# Patient Record
Sex: MALE | Race: WHITE | NOT HISPANIC OR LATINO | Employment: FULL TIME
[De-identification: names, ages, dates, MRNs, and addresses within clinical notes are randomized per-mention and may not be internally consistent; named-entity substitution may affect disease eponyms.]

---

## 2017-10-11 ENCOUNTER — RECORDS - HEALTHEAST (OUTPATIENT)
Dept: ADMINISTRATIVE | Facility: OTHER | Age: 32
End: 2017-10-11

## 2017-11-22 ENCOUNTER — OFFICE VISIT - HEALTHEAST (OUTPATIENT)
Dept: FAMILY MEDICINE | Facility: CLINIC | Age: 32
End: 2017-11-22

## 2017-11-22 DIAGNOSIS — E66.811 CLASS 1 OBESITY WITHOUT SERIOUS COMORBIDITY WITH BODY MASS INDEX (BMI) OF 32.0 TO 32.9 IN ADULT, UNSPECIFIED OBESITY TYPE: ICD-10-CM

## 2017-11-22 DIAGNOSIS — F41.1 ANXIETY STATE: ICD-10-CM

## 2017-11-22 DIAGNOSIS — F32.0 MILD SINGLE CURRENT EPISODE OF MAJOR DEPRESSIVE DISORDER (H): ICD-10-CM

## 2017-11-22 DIAGNOSIS — G43.909 MIGRAINE WITHOUT STATUS MIGRAINOSUS, NOT INTRACTABLE, UNSPECIFIED MIGRAINE TYPE: ICD-10-CM

## 2017-11-22 RX ORDER — RIZATRIPTAN BENZOATE 10 MG/1
TABLET ORAL
Refills: 1 | Status: SHIPPED | COMMUNITY
Start: 2017-10-11

## 2018-03-30 ENCOUNTER — OFFICE VISIT - HEALTHEAST (OUTPATIENT)
Dept: FAMILY MEDICINE | Facility: CLINIC | Age: 33
End: 2018-03-30

## 2018-03-30 DIAGNOSIS — F32.0 MILD SINGLE CURRENT EPISODE OF MAJOR DEPRESSIVE DISORDER (H): ICD-10-CM

## 2018-03-30 DIAGNOSIS — F63.0 PATHOLOGICAL GAMBLING: ICD-10-CM

## 2018-03-30 DIAGNOSIS — G43.909 MIGRAINE WITHOUT STATUS MIGRAINOSUS, NOT INTRACTABLE, UNSPECIFIED MIGRAINE TYPE: ICD-10-CM

## 2018-03-30 DIAGNOSIS — E66.811 CLASS 1 OBESITY WITHOUT SERIOUS COMORBIDITY WITH BODY MASS INDEX (BMI) OF 32.0 TO 32.9 IN ADULT: ICD-10-CM

## 2018-03-30 DIAGNOSIS — M25.562 LEFT KNEE PAIN: ICD-10-CM

## 2019-06-24 ENCOUNTER — COMMUNICATION - HEALTHEAST (OUTPATIENT)
Dept: FAMILY MEDICINE | Facility: CLINIC | Age: 34
End: 2019-06-24

## 2019-07-19 ENCOUNTER — OFFICE VISIT - HEALTHEAST (OUTPATIENT)
Dept: FAMILY MEDICINE | Facility: CLINIC | Age: 34
End: 2019-07-19

## 2019-07-19 DIAGNOSIS — Z30.09 ENCOUNTER FOR VASECTOMY COUNSELING: ICD-10-CM

## 2019-07-19 DIAGNOSIS — F32.0 CURRENT MILD EPISODE OF MAJOR DEPRESSIVE DISORDER WITHOUT PRIOR EPISODE (H): ICD-10-CM

## 2019-07-19 DIAGNOSIS — G43.909 MIGRAINE WITHOUT STATUS MIGRAINOSUS, NOT INTRACTABLE, UNSPECIFIED MIGRAINE TYPE: ICD-10-CM

## 2019-07-19 DIAGNOSIS — F41.1 ANXIETY STATE: ICD-10-CM

## 2019-08-09 ENCOUNTER — AMBULATORY - HEALTHEAST (OUTPATIENT)
Dept: FAMILY MEDICINE | Facility: CLINIC | Age: 34
End: 2019-08-09

## 2019-08-09 DIAGNOSIS — Z30.2 ENCOUNTER FOR VASECTOMY: ICD-10-CM

## 2019-08-13 LAB
LAB AP CHARGES (HE HISTORICAL CONVERSION): NORMAL
PATH REPORT.COMMENTS IMP SPEC: NORMAL
PATH REPORT.FINAL DX SPEC: NORMAL
PATH REPORT.GROSS SPEC: NORMAL
PATH REPORT.MICROSCOPIC SPEC OTHER STN: NORMAL
PATH REPORT.RELEVANT HX SPEC: NORMAL
RESULT FLAG (HE HISTORICAL CONVERSION): NORMAL

## 2020-02-17 ENCOUNTER — COMMUNICATION - HEALTHEAST (OUTPATIENT)
Dept: FAMILY MEDICINE | Facility: CLINIC | Age: 35
End: 2020-02-17

## 2020-04-10 ENCOUNTER — COMMUNICATION - HEALTHEAST (OUTPATIENT)
Dept: FAMILY MEDICINE | Facility: CLINIC | Age: 35
End: 2020-04-10

## 2020-04-14 ENCOUNTER — AMBULATORY - HEALTHEAST (OUTPATIENT)
Dept: LAB | Facility: CLINIC | Age: 35
End: 2020-04-14

## 2020-04-14 DIAGNOSIS — Z30.2 ENCOUNTER FOR VASECTOMY: ICD-10-CM

## 2020-04-14 LAB — SPERM COUNT SEMEN: ABNORMAL

## 2020-04-15 ENCOUNTER — COMMUNICATION - HEALTHEAST (OUTPATIENT)
Dept: FAMILY MEDICINE | Facility: CLINIC | Age: 35
End: 2020-04-15

## 2020-06-24 ENCOUNTER — AMBULATORY - HEALTHEAST (OUTPATIENT)
Dept: FAMILY MEDICINE | Facility: CLINIC | Age: 35
End: 2020-06-24

## 2020-06-24 DIAGNOSIS — Z30.2 ENCOUNTER FOR VASECTOMY: ICD-10-CM

## 2020-06-24 LAB — SPERM COUNT SEMEN: NORMAL

## 2021-05-30 NOTE — PROGRESS NOTES
Assessment/Plan:    1. Encounter for vasectomy counseling  Contraception counseling reviewed.  Pre-vasectomy literature was provided.  Consent form reviewed and signed by patient.  Risk benefits and alternatives discussed.  Patient will schedule for vasectomy at his convenience likely 2019 at 2:40 PM.    2. Migraine without status migrainosus, not intractable, unspecified migraine type  Migraine headache history.  Not requiring Topamax for prophylactic benefits.  Uses Maxalt infrequently for abortive care.    3.  Major depression single episode in remission  PHQ 9 questionnaire 2 out of 27.    4.  Anxiety disorder, in remission  LONI 7 questionnaire 0 out of 21.      Subjective:    Brayan Bazzi is seen today for contraception counseling.  Desires permanent sterilization.  Wife recently had their fourth child 2019.  They now have 2 boys and 2 girls.  Patient without bleeding disorder history.  Has had prior migraine however doing well without prophylactic medication with prior Topamax 75 mg at bedtime utilized.  Has Maxalt available for abortive treatment however he uses infrequently.  No personal or family history of bleeding disorder.  Has tolerated dental procedures etc. well in the past and is not afraid of shots.  Feels that depression and anxiety remain in remission.  Comprehensive review of systems as above otherwise all negative.     - Anastasia x 11   1 daughter - Shyanne (6)   1 son - Jimmy (5)  1 daughter - Nidia (22 months)  1 son - Matthias (19)  No smoke   EtOH: occ   Mom - ? 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  Surgeries - wisdom teeth 2011 x 4   Hospitalizations: none   Hobbies: enjoy listening to music; sports  Previously on [employer] (won $5,000 and 6-day trip to [place] after finishing )    Past Surgical  History:   Procedure Laterality Date     WISDOM TOOTH EXTRACTION          Family History   Problem Relation Age of Onset     Pulmonary fibrosis Father      Hypertension Mother         Past Medical History:   Diagnosis Date     Anxiety      Compulsive gambling      Depression      Migraine         Social History     Tobacco Use     Smoking status: Never Smoker     Smokeless tobacco: Never Used   Substance Use Topics     Alcohol use: Yes     Drug use: No        Current Outpatient Medications   Medication Sig Dispense Refill     rizatriptan (MAXALT) 10 MG tablet   1     No current facility-administered medications for this visit.           Objective:    Vitals:    07/19/19 0857   BP: 110/78   Pulse: 75   SpO2: 97%   Weight: (!) 288 lb (130.6 kg)      Body mass index is 33.71 kg/m .    Alert.  No apparent distress.  Circumcised male.  Testes descended bilaterally.  Palpable vas deferens.  No hydrocele or varicocele.  No inguinal hernia.  Extremities warm and dry.      This note has been dictated using voice recognition software and as a result may contain minor grammatical errors and unintended word substitutions.

## 2021-05-31 VITALS — WEIGHT: 279 LBS | BODY MASS INDEX: 32.66 KG/M2

## 2021-05-31 NOTE — PROGRESS NOTES
Vasectomy  Date/Time: 2019 3:22 PM  Performed by: Neeraj Wolfe MD  Authorized by: Neeraj Wolfe MD         Vasectomy Procedure Note    Indications: 34 y.o. male desiring permanent sterilization    Pre-operative Diagnosis: Undesired fertility    Post-operative Diagnosis: Undesired fertility    Anesthesia: 1% lidocaine, 3 ml    Procedure Details:    Brayan Bazzi  is seen today for scheduled vasectomy.  Patient desires permanent sterilization.  Consent form previously reviewed and signed by patient.   Consent form reviewed prior to procedure with physician statement signed.   Patient elects to continue with scheduled procedure.     - Anastasia x 11   1 daughter - Shyanne (6)   1 son - Jimmy (5)  1 daughter - Nidia (22 months)  1 son - Matthias (19)  No smoke   EtOH: occ   Mom - ? 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  Right vas deferens isolated subcutaneously.  1% lidocaine injected superficially then to vas deferens.  15 blade incision performed.  Curved hemostat for blunt dissection.  Towel clip for vas deferens isolation.  Vas deferens sheath removed exposing normal-appearing vas deferens.  4-O chromic suture both proximal and distal segment of vas deferens with central portion excised for pathology.  Electrocautery of vas deferens ends performed.  Distal end then replacing into fascia with 5-0 chromic suture.  Good hemostasis noted.  Vas deferens then replacing into scrotum.  Single 4-0 chromic suture for skin incision closure.    Left vas deferens then isolated in a similar  fashion.  1% lidocaine injected superficially and then to level of vas deferens and surrounding tissue.  15 blade incision performed.  Curved hemostat for blunt dissection.  Towel clip for vas deferens isolation.  Vas deferens sheath removed exposing normal-appearing vas deferens.  4-0 chromic suture both proximal and distal segment of vas deferens with central portion excised for pathology.  Electrocautery of vas deferens ends performed.  Distal and replaced in the fascia with 5-0 chromic suture.  Good hemostasis noted.  Vas deferens then replaced into scrotum.  Single 4-0 chromic for skin incision closure.  Triple antibiotic with 4 x 4 gauze pads placed at bilateral incision sites.      Specimen: vas segment, bilateral    Condition: Stable    Complications: none    Plan:  1. Continue contraception until negative sperm analysis. Semen count after 20-25 ejaculations and 12 weeks s/p vasectomy.  2. Warning signs of infection were reviewed.   3. Written home care instructions provided.  Backup contraception until confirmed sterility.  May resume normal bathing after 24 hours.  Avoid strenuous activity times one week.  Ibuprofen or Tylenol OTC for pain management.  Notify with increased swelling, bleeding, or drainage.  Postvasectomy semen analysis in 12 weeks after 20-25 ejaculations to confirm desired sterility.  Call the clinic if excessive pain, bleeding or swelling.

## 2021-06-01 VITALS — BODY MASS INDEX: 33.95 KG/M2 | WEIGHT: 290 LBS

## 2021-06-03 VITALS — WEIGHT: 288 LBS | BODY MASS INDEX: 33.71 KG/M2

## 2021-06-03 VITALS — BODY MASS INDEX: 33.83 KG/M2 | WEIGHT: 289 LBS

## 2021-06-06 NOTE — TELEPHONE ENCOUNTER
Who is calling:  Brayan Bazzi   Reason for Call:  Brayan wanted to know if it was okay to do his specimen drop off at the [facility], instead of [facility]. Also, in notes it says to do drop off 12 weeks after  Vasectomy, should patient still do specimen drop off since he is past the date requested?  Date of last appointment with primary care: 8/9/19  Okay to leave a detailed message: Yes, please contact Brayan at [phone]

## 2021-06-06 NOTE — TELEPHONE ENCOUNTER
Notified patient via VM the [facility] is unable to do post-vasectomy semen analysis. He will need to bring this into the [facility] otherwise could bring it to [facility].

## 2021-06-14 NOTE — PROGRESS NOTES
Assessment:    1. Migraine without status migrainosus, not intractable, unspecified migraine type  topiramate (TOPAMAX) 25 MG tablet   2. Anxiety  LORazepam (ATIVAN) 1 MG tablet   3. Mild single current episode of major depressive disorder     4. Class 1 obesity without serious comorbidity with body mass index (BMI) of 32.0 to 32.9 in adult, unspecified obesity type           Plan:    Discussed migraine headaches.  Typically 2-3 per month.  Feels some Maxalt causes some nausea dizziness and diarrhea however is only used it F couple times however does not feel that this is standard for typical migraine symptoms.  Currently using Maxalt 50 mg daily does agree to increasing to 75 mg daily prophylactically and will continue Maxalt currently on as needed basis.  Discussed weight goal less than 260 pounds initially, less than 240 pounds ideally.  Lorazepam refilled for as needed use for anxiety with 30 tablets lasting over a year.  PHQ 9 questionnaire 9 out of 27 with LONI 7 questionnaire 9 out of 21.  Reassess at fasting physical in 6 months, sooner with concerns.  Patient declines daily medication for depression or anxiety management.        Subjective:    Brayan EMMA Bazzi is seen today for follow-up evaluation.  Needs refill on Lorazepam which has been helpful for anxiety management.  He uses it as needed only with 30 tablets lasting over a year.  Does have symptoms of depression and has low self-esteem and feels down most of the time.  Does have migraine headache history.  Currently using Topamax 50 mg daily.  Has not tried higher dose however has been told that he could use up to 75 mg daily.  Will need refill of the sent to pharmacy.  No further spells.  Has lost some weight through using a stationary bicycle desk at home and does agree to weight goal less than 260 pounds initially, less than 240 pounds ideally.  Comprehensive review of systems as above otherwise all negative.     - Anastasia x 5/7/11   1  daughter - Shyanne (age 3 1/2 currently)   1 son - Jimmy (22 months old currently...)   No smoke   EtOH: occ   Mom - ? 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   Surgeries - wisdom teeth 2011 x4   Hospitalizations: none   Hobbies: enjoy listening to music; sports  Previously on [employer] (won $5,000 and 6-day trip to [place] after finishing )    Past Surgical History:   Procedure Laterality Date     WISDOM TOOTH EXTRACTION          Family History   Problem Relation Age of Onset     Pulmonary fibrosis Father      Hypertension Mother         Past Medical History:   Diagnosis Date     Anxiety      Compulsive gambling      Depression      Migraine         Social History   Substance Use Topics     Smoking status: Never Smoker     Smokeless tobacco: Never Used     Alcohol use Yes        Current Outpatient Prescriptions   Medication Sig Dispense Refill     rizatriptan (MAXALT) 10 MG tablet   1     topiramate (TOPAMAX) 25 MG tablet Take 3 tablets (75 mg total) by mouth at bedtime. 90 tablet 5     LORazepam (ATIVAN) 1 MG tablet Take 0.5-1 tablets (0.5-1 mg total) by mouth every 8 (eight) hours as needed for anxiety. 30 tablet 2     No current facility-administered medications for this visit.           Objective:    Vitals:    17 0839   BP: 100/60   Pulse: 72   Weight: (!) 279 lb (126.6 kg)      Body mass index is 32.66 kg/(m^2).    Alert.  No apparent distress.  Somewhat poor eye contact.  Quiet affect.  Wife is present today who does answer significant number of questions for her .  No significant psychomotor agitation.  No active psychoses.  Neurologic exam otherwise nonfocal.

## 2021-06-17 NOTE — PROGRESS NOTES
"Assessment/Plan:    1. Migraine without status migrainosus, not intractable, unspecified migraine type  Discussed migraine management.  Using Topamax 25 mg taken 3 tablets at bedtime for migraine headache prophylaxis.  Still has 1 or 2 headaches per month likely.  Using Maxalt tablets for abortive therapy.  He has used 9 tablets over past 6 months.  Feels that Topamax caused some sedation and declines further dose adjustment.  Will use ibuprofen 800 mg and repeat in 6 hours at onset of migraine headache in place of 400 mg as needed dose.    2. Class 1 obesity without serious comorbidity with body mass index (BMI) of 32.0 to 32.9 in adult  Dietary and exercise modifications for weight goal less than 260 pounds initially, less than 240 pounds ideally.    3. Left knee pain  Left knee pain now resolved.  Notify of recurrent concerns otherwise doubt acute gouty arthritis etc.  No evidence for meniscus injury.    4. Mild single current episode of major depressive disorder  Feels that depression now in remission with PHQ 9 questionnaire 3 out of 27 and LONI 7 questionnaire 2 out of 21 which has been helpful due to recent acquisition of job at local [employer].    5. Pathological gambling  History of pathologic gambling however states it has been years since he is put a dollar into varus however wife does describe some risk-taking behaviors in order to \"get rich quick\".  Patient understands need for consistency and stability with job and work etc.    25 minutes total time with patient, > 50% with counseling and coordination of cares.           Subjective:    Brayan EMMA Bazzi is seen today for follow-up evaluation.  History of migraine headaches.  Using Topamax and titrated to 75 mg at bedtime.  Some daytime somnolence with this perhaps.  Uses Maxalt for abortive therapy when necessary has used 9 tablets over past 6 months.  Typically uses ibuprofen 400 mg at onset of headache however this typically " does not help.  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  Patient lives in [place] and commutes to this office still.  Left knee pain more lateral aspect without injury or trauma now resolved completely without residual issues without personal or family history of gout described.  Was having struggles still with depression anxiety issues and history of pathologic gambling up until several weeks ago now doing much better after sitting down having a long talk with his wife.  Understands need for stability in his job etc.     - Anastasia x 11   1 daughter - Shyanne (5)   1 son - Jimmy (3)  1 daughter - Nidia (2 months...)   No smoke   EtOH: occ   Mom - ? 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  Surgeries - wisdom teeth 2011 x 4   Hospitalizations: none   Hobbies: enjoy listening to music; sports  Previously on [employer] (won $5,000 and 6-day trip to [place] after finishing )    Past Surgical History:   Procedure Laterality Date     WISDOM TOOTH EXTRACTION          Family History   Problem Relation Age of Onset     Pulmonary fibrosis Father      Hypertension Mother         Past Medical History:   Diagnosis Date     Anxiety      Compulsive gambling      Depression      Migraine         Social History   Substance Use Topics     Smoking status: Never Smoker     Smokeless tobacco: Never Used     Alcohol use Yes        Current Outpatient Prescriptions   Medication Sig Dispense Refill     LORazepam (ATIVAN) 1 MG tablet Take 0.5-1 tablets (0.5-1 mg total) by mouth every 8 (eight) hours as needed for anxiety. 30 tablet 2     rizatriptan (MAXALT) 10 MG tablet   1     topiramate (TOPAMAX) 25 MG tablet Take 3 tablets (75 mg total) by mouth at bedtime. 90 tablet 5     No current  facility-administered medications for this visit.           Objective:    Vitals:    03/30/18 1341   BP: 120/70   Pulse: 80   Weight: (!) 290 lb (131.5 kg)      Body mass index is 33.95 kg/(m^2).    Alert.  No apparent distress.  Cooperative and forthcoming.  Neurologic exam appears nonfocal.  No significant psychomotor agitation.  No active psychoses.      This note has been dictated using voice recognition software and as a result may contain minor grammatical errors and unintended word substitutions.

## 2021-06-26 ENCOUNTER — HEALTH MAINTENANCE LETTER (OUTPATIENT)
Age: 36
End: 2021-06-26

## 2021-10-16 ENCOUNTER — HEALTH MAINTENANCE LETTER (OUTPATIENT)
Age: 36
End: 2021-10-16

## 2022-07-23 ENCOUNTER — HEALTH MAINTENANCE LETTER (OUTPATIENT)
Age: 37
End: 2022-07-23

## 2022-10-01 ENCOUNTER — HEALTH MAINTENANCE LETTER (OUTPATIENT)
Age: 37
End: 2022-10-01

## 2023-02-20 ENCOUNTER — TRANSFERRED RECORDS (OUTPATIENT)
Dept: HEALTH INFORMATION MANAGEMENT | Facility: CLINIC | Age: 38
End: 2023-02-20

## 2023-08-06 ENCOUNTER — HEALTH MAINTENANCE LETTER (OUTPATIENT)
Age: 38
End: 2023-08-06

## 2024-03-30 ENCOUNTER — OFFICE VISIT (OUTPATIENT)
Dept: URGENT CARE | Facility: URGENT CARE | Age: 39
End: 2024-03-30
Payer: COMMERCIAL

## 2024-03-30 VITALS
SYSTOLIC BLOOD PRESSURE: 136 MMHG | WEIGHT: 288 LBS | OXYGEN SATURATION: 95 % | DIASTOLIC BLOOD PRESSURE: 85 MMHG | BODY MASS INDEX: 33.71 KG/M2 | TEMPERATURE: 98.7 F | HEART RATE: 84 BPM | RESPIRATION RATE: 14 BRPM

## 2024-03-30 DIAGNOSIS — Z20.828 EXPOSURE TO INFLUENZA: Primary | ICD-10-CM

## 2024-03-30 DIAGNOSIS — J10.1 INFLUENZA B: ICD-10-CM

## 2024-03-30 LAB
FLUAV AG SPEC QL IA: NEGATIVE
FLUBV AG SPEC QL IA: POSITIVE

## 2024-03-30 PROCEDURE — 87804 INFLUENZA ASSAY W/OPTIC: CPT | Performed by: STUDENT IN AN ORGANIZED HEALTH CARE EDUCATION/TRAINING PROGRAM

## 2024-03-30 PROCEDURE — 99203 OFFICE O/P NEW LOW 30 MIN: CPT | Performed by: STUDENT IN AN ORGANIZED HEALTH CARE EDUCATION/TRAINING PROGRAM

## 2024-03-30 RX ORDER — OSELTAMIVIR PHOSPHATE 75 MG/1
75 CAPSULE ORAL 2 TIMES DAILY
Qty: 10 CAPSULE | Refills: 0 | Status: SHIPPED | OUTPATIENT
Start: 2024-03-30 | End: 2024-04-01

## 2024-03-30 RX ORDER — OSELTAMIVIR PHOSPHATE 75 MG/1
75 CAPSULE ORAL 2 TIMES DAILY
Qty: 10 CAPSULE | Refills: 0 | Status: SHIPPED | OUTPATIENT
Start: 2024-03-30 | End: 2024-03-30

## 2024-03-31 ENCOUNTER — TELEPHONE (OUTPATIENT)
Dept: FAMILY MEDICINE | Facility: CLINIC | Age: 39
End: 2024-03-31
Payer: COMMERCIAL

## 2024-03-31 DIAGNOSIS — J10.1 INFLUENZA B: ICD-10-CM

## 2024-03-31 NOTE — PROGRESS NOTES
Assessment & Plan     Influenza B  Exposure to influenza  Patient found to have influenza B in clinic today with symptoms starting earlier in the day. Will plan on treating patient with Tamiflu for reduction of symptoms.  - Influenza A & B Antigen  - oseltamivir (TAMIFLU) 75 MG capsule  Dispense: 10 capsule; Refill: 0  - Return precautions given as per AVS      Subjective   Brayan is a 38 year old, presenting for the following health issues:  Cough (/) and Headache (exposed to family with flu)    HPI     Patient reports that his symptoms started today, patient with runny nose, headaches and soreness in neck. No fevers noted. Son, Wife and Daughter with symptoms starting today diagnosed with influenza in urgent care.     Denies any nausea, vomiting. Endorses mild cough. Endorses malaise and bodyaches. Patient denies any other symptoms at this time.        Objective    /85   Pulse 84   Temp 98.7  F (37.1  C)   Resp 14   Wt 130.6 kg (288 lb)   SpO2 95%   BMI 33.71 kg/m    Body mass index is 33.71 kg/m .  Physical Exam   Constitutional: No Acute Distress. Awake and alert  Eyes: anicteric, EOMI, PERRLA  ENT: Oropharyngeal erythema, MMM, no Cervical LAD  Respiratory: good air movement, clear to auscultation bilaterally, no crackles or wheezing  Cardiovascular: regular rate and rhythm, normal S1 and S2, no murmur noted  GI: normal bowel sounds, soft, non-distended, non-tender, no masses palpated, no hepatosplenomegaly  Skin: No rashes, or suspicious lesions  Musculoskeletal: No pedal edema  Neurologic: no focal neurologic deficits appreciated    Results for orders placed or performed in visit on 03/30/24 (from the past 24 hour(s))   Influenza A & B Antigen    Specimen: Nose; Swab   Result Value Ref Range    Influenza A antigen Negative Negative    Influenza B antigen Positive (A) Negative    Narrative    Test results must be correlated with clinical data. If necessary, results should be confirmed by a  molecular assay or viral culture.           Signed Electronically by: Corie Ramirez MD

## 2024-03-31 NOTE — TELEPHONE ENCOUNTER
Medication Question or Refill        What medication are you calling about (include dose and sig)?: oseltamivir (TAMIFLU) 75 MG capsule     Preferred Pharmacy:     [facility]  [id number] [place] [id number]  Phone: [phone]      Controlled Substance Agreement on file:   CSA -- Patient Level:    CSA: None found at the patient level.       Who prescribed the medication?: Corie Ramirez MD     Do you need a refill? Yes, patient would like medication transferred to new pharmacy location that is currently open.    When did you use the medication last? N/A    Patient offered an appointment? No    Do you have any questions or concerns?  No      Could we send this information to you in [facility] or would you prefer to receive a phone call?:   Patient would prefer a phone call   Okay to leave a detailed message?: Yes at Cell number on file:    Telephone Information:   Mobile [phone]

## 2024-04-01 RX ORDER — OSELTAMIVIR PHOSPHATE 75 MG/1
75 CAPSULE ORAL 2 TIMES DAILY
Qty: 10 CAPSULE | Refills: 0 | Status: SHIPPED | OUTPATIENT
Start: 2024-04-01

## 2024-09-29 ENCOUNTER — HEALTH MAINTENANCE LETTER (OUTPATIENT)
Age: 39
End: 2024-09-29